# Patient Record
Sex: MALE
[De-identification: names, ages, dates, MRNs, and addresses within clinical notes are randomized per-mention and may not be internally consistent; named-entity substitution may affect disease eponyms.]

---

## 2019-04-01 ENCOUNTER — APPOINTMENT (OUTPATIENT)
Dept: ORTHOPEDIC SURGERY | Facility: CLINIC | Age: 39
End: 2019-04-01
Payer: COMMERCIAL

## 2019-04-01 VITALS
SYSTOLIC BLOOD PRESSURE: 118 MMHG | HEIGHT: 69 IN | WEIGHT: 172 LBS | HEART RATE: 54 BPM | BODY MASS INDEX: 25.48 KG/M2 | DIASTOLIC BLOOD PRESSURE: 76 MMHG

## 2019-04-01 DIAGNOSIS — Z78.9 OTHER SPECIFIED HEALTH STATUS: ICD-10-CM

## 2019-04-01 PROBLEM — Z00.00 ENCOUNTER FOR PREVENTIVE HEALTH EXAMINATION: Status: ACTIVE | Noted: 2019-04-01

## 2019-04-01 PROCEDURE — 99204 OFFICE O/P NEW MOD 45 MIN: CPT

## 2019-04-01 PROCEDURE — 72040 X-RAY EXAM NECK SPINE 2-3 VW: CPT

## 2019-04-01 RX ORDER — DICLOFENAC SODIUM 75 MG/1
75 TABLET, DELAYED RELEASE ORAL
Qty: 1 | Refills: 1 | Status: ACTIVE | COMMUNITY
Start: 2019-04-01 | End: 1900-01-01

## 2019-04-01 NOTE — HISTORY OF PRESENT ILLNESS
[de-identified] : Patient is a pleasant, 38-year-old male comes in today complaining of nonradiating neck pain for 3 months. Denies any radicular symptoms, or recent injury.\par Patient states that he is , and plays video games for hours.\par No treatment\par Not getting worse or better\par No fever chills sweats nausea vomiting no bowel or bladder dysfunction\par no recent weight loss or gain no night pain. \par This history is in addition to the intake form that I personally reviewed.

## 2019-04-01 NOTE — DISCUSSION/SUMMARY
[de-identified] : Cervicalgia\par Diclofenac\par Physical therap\par Lifestyle modifications discussed\par If no better he will followup with Dr. Jose Rivas or another spine specialist\par Options discussed.\par All questions answered.\par The patient is in full agreement with the plan, \par and happy with the visit\par

## 2019-04-01 NOTE — PHYSICAL EXAM
[de-identified] : Tenderness to palpation over the interspinous ligaments cervicothoracic junction\par Neurologically intact distally and symmetrical\par otherwise, 5 out of 5 motor strength, sensation is intact and symmetrical full range of motion flexion extension and rotation, no palpatory tenderness full range of motion of hips knees shoulders and elbows (all four extremities), no atrophy, negative straight leg raise, no pathological reflexes, no swelling, normal ambulation, no apparent distress skin is intact, no palpable lymph nodes, no upper or lower extremity instability, alert and oriented x3 and normal mood. Normal finger-to nose test.  [de-identified] : 2 views of the cervical spine negative for any obvious fracture, dislocation, adequate lordosis\par The patient understands that this is a wet read and I am not a radiologist.\par If the final read reveals something different than discussed in the office the patient will get a call back in the \par next 24-48 hours to discuss.

## 2019-05-13 ENCOUNTER — APPOINTMENT (OUTPATIENT)
Dept: ORTHOPEDIC SURGERY | Facility: CLINIC | Age: 39
End: 2019-05-13

## 2020-01-24 ENCOUNTER — APPOINTMENT (OUTPATIENT)
Dept: ORTHOPEDIC SURGERY | Facility: CLINIC | Age: 40
End: 2020-01-24

## 2020-01-29 ENCOUNTER — APPOINTMENT (OUTPATIENT)
Dept: ORTHOPEDIC SURGERY | Facility: CLINIC | Age: 40
End: 2020-01-29
Payer: COMMERCIAL

## 2020-01-29 VITALS
HEART RATE: 54 BPM | DIASTOLIC BLOOD PRESSURE: 76 MMHG | BODY MASS INDEX: 25.48 KG/M2 | WEIGHT: 172 LBS | HEIGHT: 69 IN | SYSTOLIC BLOOD PRESSURE: 118 MMHG

## 2020-01-29 DIAGNOSIS — M54.2 CERVICALGIA: ICD-10-CM

## 2020-01-29 PROCEDURE — 99214 OFFICE O/P EST MOD 30 MIN: CPT

## 2020-01-29 NOTE — PHYSICAL EXAM
[de-identified] : Previous x-rays of the cervical and thoracic spine does not reveal any instability or aggressive lesion

## 2020-01-29 NOTE — HISTORY OF PRESENT ILLNESS
[de-identified] : Mr. LISANDRO GONSALEZ  is a 39 year old male who presents with a chronic history of neck  pain that has gotten worse the past week without any specific cause or trauma.  Denies any UE radicular symptoms or any pain, numbness, or tingling wrapping around his chest..  Normal bowel and bladder control.   Denies any recent fevers, chills, sweats, weight loss, or infection.\par